# Patient Record
Sex: FEMALE | Race: WHITE | HISPANIC OR LATINO | Employment: UNEMPLOYED | URBAN - METROPOLITAN AREA
[De-identification: names, ages, dates, MRNs, and addresses within clinical notes are randomized per-mention and may not be internally consistent; named-entity substitution may affect disease eponyms.]

---

## 2019-05-07 ENCOUNTER — OFFICE VISIT (OUTPATIENT)
Dept: OBGYN CLINIC | Facility: CLINIC | Age: 29
End: 2019-05-07

## 2019-05-07 VITALS
WEIGHT: 116 LBS | BODY MASS INDEX: 22.78 KG/M2 | SYSTOLIC BLOOD PRESSURE: 119 MMHG | HEART RATE: 79 BPM | HEIGHT: 60 IN | DIASTOLIC BLOOD PRESSURE: 58 MMHG

## 2019-05-07 DIAGNOSIS — Z30.09 BIRTH CONTROL COUNSELING: ICD-10-CM

## 2019-05-07 DIAGNOSIS — Z01.419 WOMEN'S ANNUAL ROUTINE GYNECOLOGICAL EXAMINATION: Primary | ICD-10-CM

## 2019-05-07 PROCEDURE — 99385 PREV VISIT NEW AGE 18-39: CPT | Performed by: NURSE PRACTITIONER

## 2019-05-07 PROCEDURE — G0145 SCR C/V CYTO,THINLAYER,RESCR: HCPCS | Performed by: NURSE PRACTITIONER

## 2019-05-07 PROCEDURE — 87624 HPV HI-RISK TYP POOLED RSLT: CPT | Performed by: NURSE PRACTITIONER

## 2019-05-09 LAB
HPV HR 12 DNA CVX QL NAA+PROBE: NEGATIVE
HPV16 DNA CVX QL NAA+PROBE: NEGATIVE
HPV18 DNA CVX QL NAA+PROBE: NEGATIVE

## 2019-05-13 LAB
LAB AP GYN PRIMARY INTERPRETATION: NORMAL
Lab: NORMAL

## 2021-06-18 ENCOUNTER — OFFICE VISIT (OUTPATIENT)
Dept: OBGYN CLINIC | Facility: CLINIC | Age: 31
End: 2021-06-18

## 2021-06-18 VITALS
HEART RATE: 65 BPM | WEIGHT: 115 LBS | HEIGHT: 57 IN | BODY MASS INDEX: 24.81 KG/M2 | DIASTOLIC BLOOD PRESSURE: 58 MMHG | SYSTOLIC BLOOD PRESSURE: 119 MMHG

## 2021-06-18 DIAGNOSIS — B37.3 VULVOVAGINAL CANDIDIASIS: Primary | ICD-10-CM

## 2021-06-18 LAB — SL AMB POCT URINE HCG: NEGATIVE

## 2021-06-18 PROCEDURE — 81025 URINE PREGNANCY TEST: CPT | Performed by: OBSTETRICS & GYNECOLOGY

## 2021-06-18 PROCEDURE — 99213 OFFICE O/P EST LOW 20 MIN: CPT | Performed by: OBSTETRICS & GYNECOLOGY

## 2021-06-18 RX ORDER — FLUCONAZOLE 150 MG/1
150 TABLET ORAL ONCE
Qty: 1 TABLET | Refills: 0 | Status: SHIPPED | OUTPATIENT
Start: 2021-06-18 | End: 2021-06-18

## 2021-06-18 RX ORDER — NYSTATIN 100000 U/G
CREAM TOPICAL 2 TIMES DAILY
Qty: 30 G | Refills: 0 | Status: SHIPPED | OUTPATIENT
Start: 2021-06-18

## 2021-06-18 NOTE — PATIENT INSTRUCTIONS
Candidiasis (infección por hongos)   CUIDADO AMBULATORIO:   La infección por hongos o candidiasis vulvovaginal es oleg infección vaginal común  La candidiasis vulvovaginal es causada por un hongo o microorganismos levaduriformes  Los hongos se encuentran normalmente en la vagina  Muchos hongos pueden causar [employer]  Comuníquese con garcias médico si:  · Usted tiene fiebre y [address]  · Usted desarrolla un dolor abdominal o pélvico     · La secreción contiene ana y no es de la menstruación  · Myla signos y [address], [address]  · Usted tiene preguntas o inquietudes acerca de garcias condición o cuidado  Signos y síntomas:  · Flujo espeso, nieto y con apariencia de queso que proviene de la vagina    · Picazón, inflamación o enrojecimiento en la vagina    · Ardor al orinar    El tratamiento para la candidiasis incluye medicamentos para tratar la infección por hongos y disminuir la inflamación  [facility]  [employer] puede venir en presentación de píldora, crema, ungüento, comprimido o supositorio para la vagina  Mantenga garcias vagina saludable:  · No tenga relaciones sexuales hasta que myla síntomas hayan desaparecido  Junior que garcias cristy use un preservativo hasta que usted complete el [place]  · Límpiese siempre de adelante hacia atrás después de usar el inodoro  [place] jignesh la diseminación de bacterias desde el área rectal hacia la vagina  · Limpie alrededor de la vulva con agua tibia y un jabón suave todos los días  Después de lavada, séquela suavemente  No use jacuzzis  El calor y la humedad de los jacuzzis pueden aumentar el riesgo de otra candidiasis  · No use ropa ni lencería apretada akil largos períodos  Que use ropa interior de [employer]  El algodón ayuda a mantener el área genital seca y no mantiene el calor o la humedad  No use ninguna ropa interior por las noches  · [place] el detergente o suavizante de telas si nelida que irrita garcias piel      · No tome duchas vaginales ni use aerosoles de higiene femenina o zo de burbujas  No utilice almohadillas o tampones que son perfumados o de colores ni papel higiénico perfumado  · Consulte con garcias médico acerca de las opciones de anticonceptivos, si es necesario  Los condones de látex y los diafragmas tienen un gel que [employer] espermatozoides  Ambos pueden irritar el área genital     Collin Sermons a mckay consultas de control con garcias médico según le indicaron  Anote mckay preguntas para que se acuerde de hacerlas akil mckay visitas  © Copyright [employer] [place] Information is for End User's use only and may not be sold, redistributed or otherwise used for commercial purposes  All illustrations and images included in CareNotes® are the copyrighted property of [employer]  or [address] [place] [address] es sólo para uso en educación  Garcias intención no es darle un consejo médico sobre enfermedades o tratamientos  Colsulte con garcias Gaylyn Harsh farmacéutico antes de seguir cualquier régimen médico para saber si es seguro y efectivo para usted

## 2021-06-18 NOTE — PROGRESS NOTES
2906  VISIT  Name: Clifton Colvin  MRN: [id number]  : 1990      ASSESSMENT/PLAN:  Problem List Items Addressed This Visit     None      Visit Diagnoses     Vulvovaginal candidiasis    -  Primary    Relevant Medications    nystatin (MYCOSTATIN) cream    fluconazole (DIFLUCAN) 150 mg tablet            SUBJECTIVE:  34yo [id number] presenting w/ complaint of vaginal discharge and irritation x 1 week  Discharge was white and thick, worse a few days ago  Now mostly irritation and pruritus  Worse at night  Reports having similar symptoms about 10 years ago, does not recall what she took  Sexually active w/   Using condoms for contraception  Irregular menses  LMP 21  Most recent pap: 2019-NILM/HPV negative  OBJECTIVE:  Vitals:    21 0850   BP: 119/58   Pulse: 65       Physical Exam  Constitutional:       General: She is not in acute distress  Appearance: She is normal weight  She is not ill-appearing  Cardiovascular:      Rate and Rhythm: Normal rate  Pulmonary:      Effort: Pulmonary effort is normal  No respiratory distress  Genitourinary:     Vagina: Vaginal discharge present  Comments: White/thick discharge, adherent to vaginal sidewalls; no bleeding; no odor noted  Skin:     General: Skin is warm and dry  Neurological:      Mental Status: She is alert and oriented to person, place, and time     Psychiatric:         Mood and Affect: Mood normal          Behavior: Behavior normal        KOH/wet mount: some hyphae, no clue cells, negative whiff, pH 4 5          Gabriela Olvera MD  OB/GYN PGY-3  2021  9:16 AM